# Patient Record
Sex: FEMALE | Race: WHITE | Employment: FULL TIME | URBAN - METROPOLITAN AREA
[De-identification: names, ages, dates, MRNs, and addresses within clinical notes are randomized per-mention and may not be internally consistent; named-entity substitution may affect disease eponyms.]

---

## 2017-03-15 PROBLEM — B35.1 ONYCHOMYCOSIS: Status: ACTIVE | Noted: 2017-03-15

## 2018-02-13 ENCOUNTER — HOSPITAL ENCOUNTER (EMERGENCY)
Facility: HOSPITAL | Age: 58
Discharge: HOME | End: 2018-02-13
Attending: EMERGENCY MEDICINE
Payer: COMMERCIAL

## 2018-02-13 ENCOUNTER — APPOINTMENT (OUTPATIENT)
Dept: GENERAL RADIOLOGY | Facility: HOSPITAL | Age: 58
End: 2018-02-13
Payer: COMMERCIAL

## 2018-02-13 ENCOUNTER — APPOINTMENT (OUTPATIENT)
Dept: CV DIAGNOSTICS | Facility: HOSPITAL | Age: 58
End: 2018-02-13
Attending: EMERGENCY MEDICINE
Payer: COMMERCIAL

## 2018-02-13 VITALS
BODY MASS INDEX: 28.32 KG/M2 | OXYGEN SATURATION: 97 % | WEIGHT: 170 LBS | HEIGHT: 65 IN | RESPIRATION RATE: 16 BRPM | SYSTOLIC BLOOD PRESSURE: 122 MMHG | TEMPERATURE: 98 F | HEART RATE: 75 BPM | DIASTOLIC BLOOD PRESSURE: 70 MMHG

## 2018-02-13 DIAGNOSIS — R07.9 ACUTE CHEST PAIN: Primary | ICD-10-CM

## 2018-02-13 LAB
ALBUMIN SERPL-MCNC: 3.5 G/DL (ref 3.5–4.8)
ALP LIVER SERPL-CCNC: 79 U/L (ref 46–118)
ALT SERPL-CCNC: 30 U/L (ref 14–54)
APTT PPP: 27.4 SECONDS (ref 25–34)
AST SERPL-CCNC: 26 U/L (ref 15–41)
ATRIAL RATE: 90 BPM
BASOPHILS # BLD AUTO: 0.02 X10(3) UL (ref 0–0.1)
BASOPHILS NFR BLD AUTO: 0.2 %
BILIRUB SERPL-MCNC: 0.4 MG/DL (ref 0.1–2)
BUN BLD-MCNC: 12 MG/DL (ref 8–20)
CALCIUM BLD-MCNC: 9.2 MG/DL (ref 8.3–10.3)
CHLORIDE: 107 MMOL/L (ref 101–111)
CO2: 24 MMOL/L (ref 22–32)
CREAT BLD-MCNC: 0.7 MG/DL (ref 0.55–1.02)
D-DIMER: 0.31 UG/ML FEU (ref 0–0.49)
EOSINOPHIL # BLD AUTO: 0.01 X10(3) UL (ref 0–0.3)
EOSINOPHIL NFR BLD AUTO: 0.1 %
ERYTHROCYTE [DISTWIDTH] IN BLOOD BY AUTOMATED COUNT: 12.8 % (ref 11.5–16)
GLUCOSE BLD-MCNC: 97 MG/DL (ref 70–99)
HAV IGM SER QL: 2 MG/DL (ref 1.7–3)
HCT VFR BLD AUTO: 40.6 % (ref 34–50)
HGB BLD-MCNC: 13.8 G/DL (ref 12–16)
IMMATURE GRANULOCYTE COUNT: 0.03 X10(3) UL (ref 0–1)
IMMATURE GRANULOCYTE RATIO %: 0.3 %
INR BLD: 0.98 (ref 0.89–1.11)
LYMPHOCYTES # BLD AUTO: 1.57 X10(3) UL (ref 0.9–4)
LYMPHOCYTES NFR BLD AUTO: 15.8 %
M PROTEIN MFR SERPL ELPH: 7.7 G/DL (ref 6.1–8.3)
MCH RBC QN AUTO: 29.6 PG (ref 27–33.2)
MCHC RBC AUTO-ENTMCNC: 34 G/DL (ref 31–37)
MCV RBC AUTO: 87.1 FL (ref 81–100)
MONOCYTES # BLD AUTO: 0.66 X10(3) UL (ref 0.1–1)
MONOCYTES NFR BLD AUTO: 6.6 %
NEUTROPHIL ABS PRELIM: 7.67 X10 (3) UL (ref 1.3–6.7)
NEUTROPHILS # BLD AUTO: 7.67 X10(3) UL (ref 1.3–6.7)
NEUTROPHILS NFR BLD AUTO: 77 %
P AXIS: 45 DEGREES
P-R INTERVAL: 164 MS
PLATELET # BLD AUTO: 221 10(3)UL (ref 150–450)
POTASSIUM SERPL-SCNC: 3.3 MMOL/L (ref 3.6–5.1)
PSA SERPL DL<=0.01 NG/ML-MCNC: 13 SECONDS (ref 12–14.3)
Q-T INTERVAL: 368 MS
QRS DURATION: 72 MS
QTC CALCULATION (BEZET): 450 MS
R AXIS: 52 DEGREES
RBC # BLD AUTO: 4.66 X10(6)UL (ref 3.8–5.1)
RED CELL DISTRIBUTION WIDTH-SD: 40.6 FL (ref 35.1–46.3)
SODIUM SERPL-SCNC: 139 MMOL/L (ref 136–144)
T AXIS: 32 DEGREES
TROPONIN: <0.046 NG/ML (ref ?–0.05)
VENTRICULAR RATE: 90 BPM
WBC # BLD AUTO: 10 X10(3) UL (ref 4–13)

## 2018-02-13 PROCEDURE — 99285 EMERGENCY DEPT VISIT HI MDM: CPT

## 2018-02-13 PROCEDURE — 71045 X-RAY EXAM CHEST 1 VIEW: CPT

## 2018-02-13 PROCEDURE — 93010 ELECTROCARDIOGRAM REPORT: CPT

## 2018-02-13 PROCEDURE — 84484 ASSAY OF TROPONIN QUANT: CPT

## 2018-02-13 PROCEDURE — 84484 ASSAY OF TROPONIN QUANT: CPT | Performed by: EMERGENCY MEDICINE

## 2018-02-13 PROCEDURE — 93350 STRESS TTE ONLY: CPT | Performed by: EMERGENCY MEDICINE

## 2018-02-13 PROCEDURE — 85610 PROTHROMBIN TIME: CPT

## 2018-02-13 PROCEDURE — 36415 COLL VENOUS BLD VENIPUNCTURE: CPT

## 2018-02-13 PROCEDURE — 85025 COMPLETE CBC W/AUTO DIFF WBC: CPT | Performed by: EMERGENCY MEDICINE

## 2018-02-13 PROCEDURE — 93017 CV STRESS TEST TRACING ONLY: CPT | Performed by: EMERGENCY MEDICINE

## 2018-02-13 PROCEDURE — 80053 COMPREHEN METABOLIC PANEL: CPT

## 2018-02-13 PROCEDURE — 80053 COMPREHEN METABOLIC PANEL: CPT | Performed by: EMERGENCY MEDICINE

## 2018-02-13 PROCEDURE — 85730 THROMBOPLASTIN TIME PARTIAL: CPT | Performed by: EMERGENCY MEDICINE

## 2018-02-13 PROCEDURE — 93018 CV STRESS TEST I&R ONLY: CPT | Performed by: EMERGENCY MEDICINE

## 2018-02-13 PROCEDURE — 85730 THROMBOPLASTIN TIME PARTIAL: CPT

## 2018-02-13 PROCEDURE — 83735 ASSAY OF MAGNESIUM: CPT | Performed by: EMERGENCY MEDICINE

## 2018-02-13 PROCEDURE — 85378 FIBRIN DEGRADE SEMIQUANT: CPT | Performed by: EMERGENCY MEDICINE

## 2018-02-13 PROCEDURE — 85025 COMPLETE CBC W/AUTO DIFF WBC: CPT

## 2018-02-13 PROCEDURE — 85610 PROTHROMBIN TIME: CPT | Performed by: EMERGENCY MEDICINE

## 2018-02-13 PROCEDURE — 93005 ELECTROCARDIOGRAM TRACING: CPT

## 2018-02-13 RX ORDER — POTASSIUM CHLORIDE 20 MEQ/1
40 TABLET, EXTENDED RELEASE ORAL ONCE
Status: COMPLETED | OUTPATIENT
Start: 2018-02-13 | End: 2018-02-13

## 2018-02-13 RX ORDER — ALBUTEROL SULFATE 90 UG/1
2 AEROSOL, METERED RESPIRATORY (INHALATION) 4 TIMES DAILY
Status: DISCONTINUED | OUTPATIENT
Start: 2018-02-13 | End: 2018-02-13

## 2018-02-13 RX ORDER — DIPHENHYDRAMINE HYDROCHLORIDE 50 MG/ML
50 INJECTION INTRAMUSCULAR; INTRAVENOUS ONCE
Status: DISCONTINUED | OUTPATIENT
Start: 2018-02-13 | End: 2018-02-13

## 2018-02-13 RX ORDER — DIPHENHYDRAMINE HCL 50 MG
CAPSULE ORAL
Status: DISCONTINUED
Start: 2018-02-13 | End: 2018-02-13

## 2018-02-13 RX ORDER — AZITHROMYCIN 250 MG/1
TABLET, FILM COATED ORAL
Qty: 1 PACKAGE | Refills: 0 | Status: SHIPPED | OUTPATIENT
Start: 2018-02-13 | End: 2018-02-18

## 2018-02-13 RX ORDER — DIPHENHYDRAMINE HCL 50 MG
50 CAPSULE ORAL ONCE
Status: COMPLETED | OUTPATIENT
Start: 2018-02-13 | End: 2018-02-13

## 2018-02-13 RX ORDER — ALBUTEROL SULFATE 90 UG/1
AEROSOL, METERED RESPIRATORY (INHALATION)
Status: DISCONTINUED
Start: 2018-02-13 | End: 2018-02-13

## 2018-02-13 NOTE — ED PROVIDER NOTES
Patient Seen in: [facility] Emergency Department    History   Patient presents with:  Chest Pain Angina (cardiovascular)    Stated Complaint: CP    HPI    60-year-old female presents to the emergency department for evaluation of sudden onset of ante middle-aged female sitting in a gurney. Vital signs were reviewed per nurse's notes. HEENT: Normocephalic atraumatic. Anicteric sclera. Oral mucosa is moist.  Oropharynx is normal.  Neck: No adenopathy or thyromegaly. Lungs are clear to auscultation. result                 Please view results for these tests on the individual orders. RAINBOW DRAW BLUE   RAINBOW DRAW LAVENDER   RAINBOW DRAW LIGHT GREEN   RAINBOW DRAW GOLD     EKG    Rate, intervals and axes as noted on EKG Report.   Rate: 84  Rhythm: U

## 2018-02-13 NOTE — ED INITIAL ASSESSMENT (HPI)
Pt reports chest tightness since last night. Pt reports recent bronchitis diagnosis yesterday. Started prednisone yesterday. Reports cough and anxiety.

## 2021-01-09 ENCOUNTER — HOSPITAL ENCOUNTER (EMERGENCY)
Facility: HOSPITAL | Age: 61
Discharge: HOME | End: 2021-01-10
Attending: EMERGENCY MEDICINE
Payer: COMMERCIAL

## 2021-01-09 DIAGNOSIS — E87.6 HYPOKALEMIA: ICD-10-CM

## 2021-01-09 DIAGNOSIS — R07.9 CHEST PAIN OF UNCERTAIN ETIOLOGY: Primary | ICD-10-CM

## 2021-01-09 LAB
BASOPHILS # BLD AUTO: 0.05 X10(3) UL (ref 0–0.2)
BASOPHILS NFR BLD AUTO: 0.5 %
DEPRECATED RDW RBC AUTO: 41.2 FL (ref 35.1–46.3)
EOSINOPHIL # BLD AUTO: 0.08 X10(3) UL (ref 0–0.7)
EOSINOPHIL NFR BLD AUTO: 0.8 %
ERYTHROCYTE [DISTWIDTH] IN BLOOD BY AUTOMATED COUNT: 12.7 % (ref 11–15)
HCT VFR BLD AUTO: 42.3 %
HGB BLD-MCNC: 14.1 G/DL
IMM GRANULOCYTES # BLD AUTO: 0.03 X10(3) UL (ref 0–1)
IMM GRANULOCYTES NFR BLD: 0.3 %
LYMPHOCYTES # BLD AUTO: 1.8 X10(3) UL (ref 1–4)
LYMPHOCYTES NFR BLD AUTO: 18.8 %
MCH RBC QN AUTO: 29.5 PG (ref 26–34)
MCHC RBC AUTO-ENTMCNC: 33.3 G/DL (ref 31–37)
MCV RBC AUTO: 88.5 FL
MONOCYTES # BLD AUTO: 0.92 X10(3) UL (ref 0.1–1)
MONOCYTES NFR BLD AUTO: 9.6 %
NEUTROPHILS # BLD AUTO: 6.7 X10 (3) UL (ref 1.5–7.7)
NEUTROPHILS # BLD AUTO: 6.7 X10(3) UL (ref 1.5–7.7)
NEUTROPHILS NFR BLD AUTO: 70 %
PLATELET # BLD AUTO: 242 10(3)UL (ref 150–450)
RBC # BLD AUTO: 4.78 X10(6)UL
WBC # BLD AUTO: 9.6 X10(3) UL (ref 4–11)

## 2021-01-09 PROCEDURE — 85025 COMPLETE CBC W/AUTO DIFF WBC: CPT | Performed by: EMERGENCY MEDICINE

## 2021-01-09 PROCEDURE — 99284 EMERGENCY DEPT VISIT MOD MDM: CPT

## 2021-01-09 PROCEDURE — 85379 FIBRIN DEGRADATION QUANT: CPT | Performed by: EMERGENCY MEDICINE

## 2021-01-09 PROCEDURE — 36415 COLL VENOUS BLD VENIPUNCTURE: CPT

## 2021-01-09 PROCEDURE — 93010 ELECTROCARDIOGRAM REPORT: CPT

## 2021-01-09 PROCEDURE — 84484 ASSAY OF TROPONIN QUANT: CPT | Performed by: EMERGENCY MEDICINE

## 2021-01-09 PROCEDURE — 80053 COMPREHEN METABOLIC PANEL: CPT | Performed by: EMERGENCY MEDICINE

## 2021-01-09 PROCEDURE — 93005 ELECTROCARDIOGRAM TRACING: CPT

## 2021-01-09 PROCEDURE — 99285 EMERGENCY DEPT VISIT HI MDM: CPT

## 2021-01-09 RX ORDER — ASPIRIN 81 MG/1
324 TABLET, CHEWABLE ORAL ONCE
Status: COMPLETED | OUTPATIENT
Start: 2021-01-09 | End: 2021-01-09

## 2021-01-10 ENCOUNTER — APPOINTMENT (OUTPATIENT)
Dept: ULTRASOUND IMAGING | Facility: HOSPITAL | Age: 61
End: 2021-01-10
Attending: EMERGENCY MEDICINE
Payer: COMMERCIAL

## 2021-01-10 ENCOUNTER — APPOINTMENT (OUTPATIENT)
Dept: GENERAL RADIOLOGY | Facility: HOSPITAL | Age: 61
End: 2021-01-10
Attending: EMERGENCY MEDICINE
Payer: COMMERCIAL

## 2021-01-10 VITALS
HEIGHT: 64 IN | WEIGHT: 170 LBS | RESPIRATION RATE: 16 BRPM | SYSTOLIC BLOOD PRESSURE: 131 MMHG | DIASTOLIC BLOOD PRESSURE: 77 MMHG | BODY MASS INDEX: 29.02 KG/M2 | HEART RATE: 93 BPM | OXYGEN SATURATION: 93 %

## 2021-01-10 LAB
ALBUMIN SERPL-MCNC: 4.1 G/DL (ref 3.4–5)
ALBUMIN/GLOB SERPL: 1 {RATIO} (ref 1–2)
ALP LIVER SERPL-CCNC: 99 U/L
ALT SERPL-CCNC: 31 U/L
ANION GAP SERPL CALC-SCNC: 6 MMOL/L (ref 0–18)
AST SERPL-CCNC: 22 U/L (ref 15–37)
ATRIAL RATE: 103 BPM
ATRIAL RATE: 77 BPM
BILIRUB SERPL-MCNC: 0.3 MG/DL (ref 0.1–2)
BUN BLD-MCNC: 15 MG/DL (ref 7–18)
BUN/CREAT SERPL: 17 (ref 10–20)
CALCIUM BLD-MCNC: 9.9 MG/DL (ref 8.5–10.1)
CHLORIDE SERPL-SCNC: 105 MMOL/L (ref 98–112)
CO2 SERPL-SCNC: 26 MMOL/L (ref 21–32)
CREAT BLD-MCNC: 0.88 MG/DL
D-DIMER: 0.31 UG/ML FEU (ref ?–0.59)
GLOBULIN PLAS-MCNC: 4.1 G/DL (ref 2.8–4.4)
GLUCOSE BLD-MCNC: 108 MG/DL (ref 70–99)
M PROTEIN MFR SERPL ELPH: 8.2 G/DL (ref 6.4–8.2)
OSMOLALITY SERPL CALC.SUM OF ELEC: 285 MOSM/KG (ref 275–295)
P AXIS: 55 DEGREES
P AXIS: 71 DEGREES
P-R INTERVAL: 184 MS
P-R INTERVAL: 186 MS
POTASSIUM SERPL-SCNC: 3 MMOL/L (ref 3.5–5.1)
Q-T INTERVAL: 360 MS
Q-T INTERVAL: 396 MS
QRS DURATION: 72 MS
QRS DURATION: 76 MS
QTC CALCULATION (BEZET): 448 MS
QTC CALCULATION (BEZET): 471 MS
R AXIS: 12 DEGREES
R AXIS: 49 DEGREES
SODIUM SERPL-SCNC: 137 MMOL/L (ref 136–145)
T AXIS: 36 DEGREES
T AXIS: 5 DEGREES
TROPONIN I SERPL-MCNC: <0.045 NG/ML (ref ?–0.04)
TROPONIN I SERPL-MCNC: <0.045 NG/ML (ref ?–0.04)
VENTRICULAR RATE: 103 BPM
VENTRICULAR RATE: 77 BPM

## 2021-01-10 PROCEDURE — 93005 ELECTROCARDIOGRAM TRACING: CPT

## 2021-01-10 PROCEDURE — 71045 X-RAY EXAM CHEST 1 VIEW: CPT | Performed by: EMERGENCY MEDICINE

## 2021-01-10 PROCEDURE — 84484 ASSAY OF TROPONIN QUANT: CPT | Performed by: EMERGENCY MEDICINE

## 2021-01-10 PROCEDURE — 93971 EXTREMITY STUDY: CPT | Performed by: EMERGENCY MEDICINE

## 2021-01-10 RX ORDER — POTASSIUM CHLORIDE 20 MEQ/1
40 TABLET, EXTENDED RELEASE ORAL ONCE
Status: COMPLETED | OUTPATIENT
Start: 2021-01-10 | End: 2021-01-10

## 2021-01-10 RX ORDER — OMEPRAZOLE 40 MG/1
40 CAPSULE, DELAYED RELEASE ORAL DAILY
Qty: 30 CAPSULE | Refills: 0 | Status: SHIPPED | OUTPATIENT
Start: 2021-01-10 | End: 2021-02-09

## 2021-01-10 RX ORDER — POTASSIUM CHLORIDE 20 MEQ/1
20 TABLET, EXTENDED RELEASE ORAL 2 TIMES DAILY
Qty: 14 TABLET | Refills: 0 | Status: SHIPPED | OUTPATIENT
Start: 2021-01-10 | End: 2021-01-17

## 2021-01-10 NOTE — ED PROVIDER NOTES
Patient Seen in: [facility] Emergency Department      History   Patient presents with:  Chest Pain Angina    Stated Complaint: CHEST PAIN    HPI/Subjective:   HPI    Patient is a 60-year-old female presents to ED for evaluation of chest pain.   Jluis Normocephalic, atraumatic. Moist mucous membranes. Pupils equal round reactive to light and accommodation, extraocular motion is intact, sclerae white, conjunctiva is pink. Oropharynx is unremarkable, no exudate.   NECK: Supple, trachea midline, no lymph 103  Rhythm: Sinus Rhythm  Reading: Sinus tachycardia without acute changes. Isolated T wave inversion lead III. EKG unchanged compared to prior EKG February 2018         EKG #2 EKG  Rate, Axis and intervals as noted.   I agree with computer interpretatio Prilosec. Patient was screened and evaluated during this visit.    As a treating physician attending to the patient, I determined, within reasonable clinical confidence and prior to discharge, that an emergency medical condition was not or was no longe

## 2024-10-22 ENCOUNTER — HOSPITAL ENCOUNTER (EMERGENCY)
Facility: HOSPITAL | Age: 64
Discharge: HOME | End: 2024-10-22
Attending: EMERGENCY MEDICINE
Payer: COMMERCIAL

## 2024-10-22 ENCOUNTER — APPOINTMENT (OUTPATIENT)
Dept: GENERAL RADIOLOGY | Facility: HOSPITAL | Age: 64
End: 2024-10-22
Attending: EMERGENCY MEDICINE
Payer: COMMERCIAL

## 2024-10-22 VITALS
HEIGHT: 64 IN | DIASTOLIC BLOOD PRESSURE: 74 MMHG | BODY MASS INDEX: 24.75 KG/M2 | SYSTOLIC BLOOD PRESSURE: 130 MMHG | TEMPERATURE: 98 F | HEART RATE: 60 BPM | WEIGHT: 145 LBS | OXYGEN SATURATION: 98 % | RESPIRATION RATE: 16 BRPM
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
## 2024-10-22 LAB
ALBUMIN SERPL-MCNC: 4.3 G/DL (ref 3.2–4.8)
ALBUMIN/GLOB SERPL: 1.8 {RATIO} (ref 1–2)
ALP LIVER SERPL-CCNC: 83 U/L
ALT SERPL-CCNC: 25 U/L
ANION GAP SERPL CALC-SCNC: 6 MMOL/L (ref 0–18)
AST SERPL-CCNC: 26 U/L (ref ?–34)
BASOPHILS # BLD AUTO: 0.05 X10(3) UL (ref 0–0.2)
BASOPHILS NFR BLD AUTO: 0.7 %
BILIRUB SERPL-MCNC: 0.3 MG/DL (ref 0.2–1.1)
BUN BLD-MCNC: 16 MG/DL (ref 9–23)
CALCIUM BLD-MCNC: 9.5 MG/DL (ref 8.7–10.4)
CHLORIDE SERPL-SCNC: 109 MMOL/L (ref 98–112)
CO2 SERPL-SCNC: 25 MMOL/L (ref 21–32)
CREAT BLD-MCNC: 0.76 MG/DL
EGFRCR SERPLBLD CKD-EPI 2021: 88 ML/MIN/1.73M2 (ref 60–?)
EOSINOPHIL # BLD AUTO: 0.17 X10(3) UL (ref 0–0.7)
EOSINOPHIL NFR BLD AUTO: 2.5 %
ERYTHROCYTE [DISTWIDTH] IN BLOOD BY AUTOMATED COUNT: 12.7 %
GLOBULIN PLAS-MCNC: 2.4 G/DL (ref 2–3.5)
GLUCOSE BLD-MCNC: 116 MG/DL (ref 70–99)
HCT VFR BLD AUTO: 37.7 %
HGB BLD-MCNC: 12.6 G/DL
IMM GRANULOCYTES # BLD AUTO: 0.01 X10(3) UL (ref 0–1)
IMM GRANULOCYTES NFR BLD: 0.1 %
LYMPHOCYTES # BLD AUTO: 2.56 X10(3) UL (ref 1–4)
LYMPHOCYTES NFR BLD AUTO: 37.4 %
MCH RBC QN AUTO: 30.1 PG (ref 26–34)
MCHC RBC AUTO-ENTMCNC: 33.4 G/DL (ref 31–37)
MCV RBC AUTO: 90.2 FL
MONOCYTES # BLD AUTO: 0.72 X10(3) UL (ref 0.1–1)
MONOCYTES NFR BLD AUTO: 10.5 %
NEUTROPHILS # BLD AUTO: 3.33 X10 (3) UL (ref 1.5–7.7)
NEUTROPHILS # BLD AUTO: 3.33 X10(3) UL (ref 1.5–7.7)
NEUTROPHILS NFR BLD AUTO: 48.8 %
OSMOLALITY SERPL CALC.SUM OF ELEC: 292 MOSM/KG (ref 275–295)
PLATELET # BLD AUTO: 207 10(3)UL (ref 150–450)
POTASSIUM SERPL-SCNC: 3.6 MMOL/L (ref 3.5–5.1)
PROT SERPL-MCNC: 6.7 G/DL (ref 5.7–8.2)
RBC # BLD AUTO: 4.18 X10(6)UL
SODIUM SERPL-SCNC: 140 MMOL/L (ref 136–145)
TROPONIN I SERPL HS-MCNC: <3 NG/L
WBC # BLD AUTO: 6.8 X10(3) UL (ref 4–11)

## 2024-10-22 PROCEDURE — 99284 EMERGENCY DEPT VISIT MOD MDM: CPT

## 2024-10-22 PROCEDURE — 84484 ASSAY OF TROPONIN QUANT: CPT | Performed by: EMERGENCY MEDICINE

## 2024-10-22 PROCEDURE — 36415 COLL VENOUS BLD VENIPUNCTURE: CPT

## 2024-10-22 PROCEDURE — 80053 COMPREHEN METABOLIC PANEL: CPT | Performed by: EMERGENCY MEDICINE

## 2024-10-22 PROCEDURE — 99285 EMERGENCY DEPT VISIT HI MDM: CPT

## 2024-10-22 PROCEDURE — 71045 X-RAY EXAM CHEST 1 VIEW: CPT | Performed by: EMERGENCY MEDICINE

## 2024-10-22 PROCEDURE — 85025 COMPLETE CBC W/AUTO DIFF WBC: CPT | Performed by: EMERGENCY MEDICINE

## 2024-10-22 RX ORDER — CEPHALEXIN 500 MG/1
500 CAPSULE ORAL 4 TIMES DAILY
Qty: 40 CAPSULE | Refills: 0 | Status: SHIPPED | OUTPATIENT
Start: 2024-10-22 | End: 2024-11-01

## 2024-10-22 NOTE — ED INITIAL ASSESSMENT (HPI)
Patient here with c/o bug bite on left shoulder, possibly a spider.  Patient put hydrocortisone cream on it.  Patient reports she was sitting there and started to feel tight in her chest.  She took her inhaler and 2 benadryl.

## 2024-10-22 NOTE — ED PROVIDER NOTES
Patient Seen in: [facility] Emergency Department      History     Chief Complaint   Patient presents with    Allergic Rxn Allergies    Bite Sting,Insect     Stated Complaint: possible reaction to bug bit on upper shoulder    Subjective:   HPI      64-year-old female presenting with possible reaction to a bite.  Patient's not sure if spider bit her something bit her deflected off she did not see what it was was on the left shoulder but she cleaned it and then placed some hydrocortisone cream on it.  Now she is experiencing some numbness over the left anterior part of her chest upper part near the clavicle she denies any sort of chest pain though she denies any shortness of breath tongue swelling or any other exacerbating relieving factor.    Objective:     Past Medical History:    Anxiety    Asthma (HCC)    Extrinsic asthma, unspecified    Leiomyoma of uterus, unspecified              Past Surgical History:   Procedure Laterality Date    Hysterectomy      Stapedectomy      bilateral                Social History     Socioeconomic History    Marital status:     Number of children: 4   Tobacco Use    Smoking status: Never    Smokeless tobacco: Never   Vaping Use    Vaping status: Never Used   Substance and Sexual Activity    Alcohol use: Yes     Comment: rare    Drug use: No                  Physical Exam     ED Triage Vitals [10/22/24 0116]   /75   Pulse 70   Resp 20   Temp 97.6 °F (36.4 °C)   Temp src Oral   SpO2 100 %   O2 Device None (Room air)       Current Vitals:   Vital Signs  BP: 130/74  Pulse: 60  Resp: 16  Temp: 97.6 °F (36.4 °C)  Temp src: Oral  MAP (mmHg): 86    Oxygen Therapy  SpO2: 98 %  O2 Device: None (Room air)        Physical Exam  Awake alert patient appears no distress HEENT exam is normal lungs are clear cardiovascular exam regular rhythm abdomen soft nontender extremities no Cyanosis or edema skin over the left shoulder mid left shoulder midclavicular line there is an area of  erythema and some swelling but no subcutaneous emphysema or crepitance    ED Course     Labs Reviewed   COMP METABOLIC PANEL (14) - Abnormal; Notable for the following components:       Result Value    Glucose 116 (*)     All other components within normal limits   TROPONIN I HIGH SENSITIVITY - Normal   CBC WITH DIFFERENTIAL WITH PLATELET            Differential diagnosis includes acute coronary syndrome, allergic reaction       MDM              Medical Decision Making  64-year-old female presenting to the emergency department for paresthesias.  IV established cardiac monitor shows a sinus rhythm pulse ox shows no signs of hypoxia.  Patient is refusing steroids at this time which would help with swelling due to her allergy.  The patient's CBC metabolic panel troponin are all within acceptable is chest x-ray independent interpretation by ED physician shows no pneumothorax the patient will be discharged home placed on antibiotics, for infection she was given instruction on antihistamines she is to return emerged part worsening symptoms other complaints  The patient was screened and evaluated during this visit.  As a treating physician attending to the patient, I determined, within reasonable clinical confidence and prior to discharge, that an emergency medical condition was not or was no longer present.  There was no indication for further evaluation, treatment or admission on an emergency basis.    The usual and customary discharge instructions were discussed given the patient's ER course.  We discussed signs and symptoms that should prompt the patient's immediate return to the emergency department.  Reasonable over-the-counter and prescription treatment options and physician follow-up plan was discussed.  Patient was discharged home in good condition  This note was prepared using Dragon Medical voice recognition dictation software.  As a result errors may occur.  When identified to these areas have been corrected.   While every attempt is made to correct errors during dictation discrepancies may still exist.  Please contact if there are any errors    Problems Addressed:  Bug bite, initial encounter: acute illness or injury    Amount and/or Complexity of Data Reviewed  Labs: ordered. Decision-making details documented in ED Course.  Radiology: ordered and independent interpretation performed. Decision-making details documented in ED Course.  ECG/medicine tests: ordered and independent interpretation performed. Decision-making details documented in ED Course.    Risk  Prescription drug management.        Disposition and Plan     Clinical Impression:  1. Bug bite, initial encounter         Disposition:  Discharge  10/22/2024  2:56 am    Follow-up:  No follow-up provider specified.        Medications Prescribed:  Current Discharge Medication List        START taking these medications    Details   cephALEXin 500 MG Oral Cap Take 1 capsule (500 mg total) by mouth 4 (four) times daily for 10 days.  Qty: 40 capsule, Refills: 0                 Supplementary Documentation:

## (undated) NOTE — ED AVS SNAPSHOT
Sapna Guera   MRN: [id number]    Department:  [facility] Emergency Department   Date of Visit:  2/13/2018           Disclosure     Insurance plans vary and the physician(s) referred by the ER may not be covered by your plan.  Please contact you tell this physician (or your personal doctor if your instructions are to return to your personal doctor) about any new or lasting problems. The primary care or specialist physician will see patients referred from the [facility] Emergency Department.  Aracely Hinkle